# Patient Record
Sex: MALE | Race: WHITE | Employment: UNEMPLOYED | URBAN - METROPOLITAN AREA
[De-identification: names, ages, dates, MRNs, and addresses within clinical notes are randomized per-mention and may not be internally consistent; named-entity substitution may affect disease eponyms.]

---

## 2019-02-13 ENCOUNTER — APPOINTMENT (OUTPATIENT)
Dept: GENERAL RADIOLOGY | Age: 44
End: 2019-02-13
Attending: EMERGENCY MEDICINE
Payer: SELF-PAY

## 2019-02-13 ENCOUNTER — APPOINTMENT (OUTPATIENT)
Dept: GENERAL RADIOLOGY | Age: 44
End: 2019-02-13
Attending: PHYSICIAN ASSISTANT
Payer: SELF-PAY

## 2019-02-13 ENCOUNTER — HOSPITAL ENCOUNTER (EMERGENCY)
Age: 44
Discharge: HOME | End: 2019-02-13
Attending: EMERGENCY MEDICINE | Admitting: EMERGENCY MEDICINE
Payer: SELF-PAY

## 2019-02-13 VITALS
HEIGHT: 68 IN | DIASTOLIC BLOOD PRESSURE: 84 MMHG | SYSTOLIC BLOOD PRESSURE: 142 MMHG | WEIGHT: 188.27 LBS | HEART RATE: 92 BPM | BODY MASS INDEX: 28.53 KG/M2 | OXYGEN SATURATION: 100 % | TEMPERATURE: 99.3 F | RESPIRATION RATE: 16 BRPM

## 2019-02-13 DIAGNOSIS — J10.1 INFLUENZA A: Primary | ICD-10-CM

## 2019-02-13 LAB
ANION GAP SERPL CALC-SCNC: 7 MMOL/L (ref 5–15)
BASOPHILS # BLD: 0 K/UL (ref 0–0.1)
BASOPHILS NFR BLD: 0 % (ref 0–1)
BUN SERPL-MCNC: 30 MG/DL (ref 6–20)
BUN/CREAT SERPL: 22 (ref 12–20)
CALCIUM SERPL-MCNC: 9.3 MG/DL (ref 8.5–10.1)
CHLORIDE SERPL-SCNC: 97 MMOL/L (ref 97–108)
CO2 SERPL-SCNC: 30 MMOL/L (ref 21–32)
CREAT SERPL-MCNC: 1.38 MG/DL (ref 0.7–1.3)
DIFFERENTIAL METHOD BLD: ABNORMAL
EOSINOPHIL # BLD: 0 K/UL (ref 0–0.4)
EOSINOPHIL NFR BLD: 0 % (ref 0–7)
ERYTHROCYTE [DISTWIDTH] IN BLOOD BY AUTOMATED COUNT: 11.9 % (ref 11.5–14.5)
FLUAV AG NPH QL IA: POSITIVE
FLUBV AG NOSE QL IA: NEGATIVE
GLUCOSE SERPL-MCNC: 101 MG/DL (ref 65–100)
HCT VFR BLD AUTO: 50.9 % (ref 36.6–50.3)
HGB BLD-MCNC: 18.3 G/DL (ref 12.1–17)
IMM GRANULOCYTES # BLD AUTO: 0 K/UL (ref 0–0.04)
IMM GRANULOCYTES NFR BLD AUTO: 1 % (ref 0–0.5)
LYMPHOCYTES # BLD: 0.5 K/UL (ref 0.8–3.5)
LYMPHOCYTES NFR BLD: 8 % (ref 12–49)
MCH RBC QN AUTO: 29.7 PG (ref 26–34)
MCHC RBC AUTO-ENTMCNC: 36 G/DL (ref 30–36.5)
MCV RBC AUTO: 82.5 FL (ref 80–99)
MONOCYTES # BLD: 0.7 K/UL (ref 0–1)
MONOCYTES NFR BLD: 13 % (ref 5–13)
NEUTS SEG # BLD: 4.4 K/UL (ref 1.8–8)
NEUTS SEG NFR BLD: 78 % (ref 32–75)
NRBC # BLD: 0 K/UL (ref 0–0.01)
NRBC BLD-RTO: 0 PER 100 WBC
PLATELET # BLD AUTO: 231 K/UL (ref 150–400)
PMV BLD AUTO: 9.9 FL (ref 8.9–12.9)
POTASSIUM SERPL-SCNC: 4.1 MMOL/L (ref 3.5–5.1)
RBC # BLD AUTO: 6.17 M/UL (ref 4.1–5.7)
SODIUM SERPL-SCNC: 134 MMOL/L (ref 136–145)
WBC # BLD AUTO: 5.6 K/UL (ref 4.1–11.1)

## 2019-02-13 PROCEDURE — 36415 COLL VENOUS BLD VENIPUNCTURE: CPT

## 2019-02-13 PROCEDURE — 74011250636 HC RX REV CODE- 250/636: Performed by: PHYSICIAN ASSISTANT

## 2019-02-13 PROCEDURE — 74011250636 HC RX REV CODE- 250/636: Performed by: NURSE PRACTITIONER

## 2019-02-13 PROCEDURE — 96374 THER/PROPH/DIAG INJ IV PUSH: CPT

## 2019-02-13 PROCEDURE — 80048 BASIC METABOLIC PNL TOTAL CA: CPT

## 2019-02-13 PROCEDURE — 99282 EMERGENCY DEPT VISIT SF MDM: CPT

## 2019-02-13 PROCEDURE — 96361 HYDRATE IV INFUSION ADD-ON: CPT

## 2019-02-13 PROCEDURE — 74011250637 HC RX REV CODE- 250/637: Performed by: EMERGENCY MEDICINE

## 2019-02-13 PROCEDURE — 96375 TX/PRO/DX INJ NEW DRUG ADDON: CPT

## 2019-02-13 PROCEDURE — 71046 X-RAY EXAM CHEST 2 VIEWS: CPT

## 2019-02-13 PROCEDURE — 74011000250 HC RX REV CODE- 250: Performed by: NURSE PRACTITIONER

## 2019-02-13 PROCEDURE — 87804 INFLUENZA ASSAY W/OPTIC: CPT

## 2019-02-13 PROCEDURE — 85025 COMPLETE CBC W/AUTO DIFF WBC: CPT

## 2019-02-13 RX ORDER — ACETAMINOPHEN 500 MG
1000 TABLET ORAL
Status: DISCONTINUED | OUTPATIENT
Start: 2019-02-13 | End: 2019-02-13

## 2019-02-13 RX ORDER — ONDANSETRON 4 MG/1
4 TABLET, ORALLY DISINTEGRATING ORAL
Qty: 20 TAB | Refills: 0 | Status: SHIPPED | OUTPATIENT
Start: 2019-02-13

## 2019-02-13 RX ORDER — BUTALBITAL, ACETAMINOPHEN AND CAFFEINE 300; 40; 50 MG/1; MG/1; MG/1
1 CAPSULE ORAL
Qty: 20 CAP | Refills: 0 | Status: SHIPPED | OUTPATIENT
Start: 2019-02-13

## 2019-02-13 RX ORDER — ACETAMINOPHEN 500 MG
1000 TABLET ORAL
Status: COMPLETED | OUTPATIENT
Start: 2019-02-13 | End: 2019-02-13

## 2019-02-13 RX ORDER — KETOROLAC TROMETHAMINE 30 MG/ML
15 INJECTION, SOLUTION INTRAMUSCULAR; INTRAVENOUS
Status: COMPLETED | OUTPATIENT
Start: 2019-02-13 | End: 2019-02-13

## 2019-02-13 RX ORDER — ONDANSETRON 2 MG/ML
8 INJECTION INTRAMUSCULAR; INTRAVENOUS
Status: DISCONTINUED | OUTPATIENT
Start: 2019-02-13 | End: 2019-02-13 | Stop reason: HOSPADM

## 2019-02-13 RX ORDER — CODEINE PHOSPHATE AND GUAIFENESIN 10; 100 MG/5ML; MG/5ML
5 SOLUTION ORAL
Qty: 300 ML | Refills: 0 | Status: SHIPPED | OUTPATIENT
Start: 2019-02-13

## 2019-02-13 RX ORDER — SODIUM CHLORIDE 0.9 % (FLUSH) 0.9 %
5-10 SYRINGE (ML) INJECTION AS NEEDED
Status: DISCONTINUED | OUTPATIENT
Start: 2019-02-13 | End: 2019-02-13 | Stop reason: HOSPADM

## 2019-02-13 RX ORDER — DIPHENHYDRAMINE HYDROCHLORIDE 50 MG/ML
25 INJECTION, SOLUTION INTRAMUSCULAR; INTRAVENOUS
Status: COMPLETED | OUTPATIENT
Start: 2019-02-13 | End: 2019-02-13

## 2019-02-13 RX ADMIN — KETOROLAC TROMETHAMINE 15 MG: 30 INJECTION, SOLUTION INTRAMUSCULAR at 01:58

## 2019-02-13 RX ADMIN — SODIUM CHLORIDE 562 ML: 900 INJECTION, SOLUTION INTRAVENOUS at 18:49

## 2019-02-13 RX ADMIN — DIPHENHYDRAMINE HYDROCHLORIDE 25 MG: 50 INJECTION, SOLUTION INTRAMUSCULAR; INTRAVENOUS at 18:10

## 2019-02-13 RX ADMIN — PROCHLORPERAZINE EDISYLATE 10 MG: 5 INJECTION INTRAMUSCULAR; INTRAVENOUS at 18:09

## 2019-02-13 RX ADMIN — ACETAMINOPHEN 1000 MG: 500 TABLET ORAL at 16:27

## 2019-02-13 RX ADMIN — SODIUM CHLORIDE 1000 ML: 900 INJECTION, SOLUTION INTRAVENOUS at 18:09

## 2019-02-13 RX ADMIN — SODIUM CHLORIDE 1000 ML: 900 INJECTION, SOLUTION INTRAVENOUS at 17:59

## 2019-02-13 NOTE — LETTER
NOTIFICATION RETURN TO WORK / SCHOOL 
 
2/13/2019 7:47 PM 
 
Mr. Sandra Bingham 
[address] [place] [address] To Whom It May Concern: 
 
Sandra Bingham is currently under the care of [facility] EMERGENCY DEPT. He will return to work/school on: February 18, 2019 If there are questions or concerns please have the patient contact our office.  
 
 
 
Sincerely, 
 
 
 
Dudley Cárdenas NP 
7:47 PM

## 2019-02-13 NOTE — ED PROVIDER NOTES
EMERGENCY DEPARTMENT HISTORY AND PHYSICAL EXAM 
 
 
Date: 2/13/2019 Patient Name: David Martins History of Presenting Illness Chief Complaint Patient presents with  Flu Like Symptoms  
  pt reports cold, flu symptoms last week, now has nausea, body aches, fever and cough History Provided By: Patient HPI: David Martins, 44 y.o. male presents ambulatory to the ED with cc of cough, nasal congestion, nasal, fever, and body aches for the last week. Son with positive flu. Pt has not taken anything for symptoms. Pt denies night sweats, chest pain, pressure, SOB, CUNHA, PND, orthopnea, abdominal pain, v/d, melena, hematuria, dysuria, constipation, dizziness, and syncope. + HA There are no other complaints, changes, or physical findings at this time. PCP: None No current facility-administered medications on file prior to encounter. No current outpatient medications on file prior to encounter. Past History Past Medical History: 
History reviewed. No pertinent past medical history. Past Surgical History: 
History reviewed. No pertinent surgical history. Family History: 
History reviewed. No pertinent family history. Social History: 
Social History Tobacco Use  Smoking status: Not on file  Smokeless tobacco: Never Used Substance Use Topics  Alcohol use: Not on file  Drug use: Not on file Allergies: 
No Known Allergies Review of Systems Review of Systems Constitutional: Positive for chills, fatigue and fever. Negative for activity change, appetite change, diaphoresis and unexpected weight change. HENT: Positive for congestion. Negative for ear pain, rhinorrhea, sinus pressure, sore throat, tinnitus, trouble swallowing and voice change. Eyes: Negative for pain, discharge, redness and visual disturbance. Respiratory: Positive for cough. Negative for apnea, choking, chest tightness, shortness of breath, wheezing and stridor. Cardiovascular: Negative for chest pain, palpitations and leg swelling. Gastrointestinal: Negative for abdominal pain, constipation, nausea and vomiting. Endocrine: Negative for cold intolerance and heat intolerance. Genitourinary: Negative for difficulty urinating, dysuria, flank pain, hematuria, testicular pain and urgency. Musculoskeletal: Positive for myalgias. Negative for arthralgias, back pain, gait problem, joint swelling, neck pain and neck stiffness. Skin: Negative for color change, pallor, rash and wound. Allergic/Immunologic: Negative for immunocompromised state. Neurological: Negative for dizziness, tremors, syncope, weakness, light-headedness, numbness and headaches. Hematological: Does not bruise/bleed easily. Psychiatric/Behavioral: Negative for agitation, confusion and suicidal ideas. Physical Exam  
Physical Exam  
Constitutional: He is oriented to person, place, and time. He appears well-developed and well-nourished. No distress. HENT:  
Head: Atraumatic. Nose: Nose normal.  
Mouth/Throat: No oropharyngeal exudate. Eyes: Conjunctivae and EOM are normal. Right eye exhibits no discharge. Left eye exhibits no discharge. No scleral icterus. Neck: Normal range of motion. Neck supple. No JVD present. No tracheal deviation present. No thyromegaly present. Cardiovascular: Normal rate and regular rhythm. Exam reveals no gallop and no friction rub. No murmur heard. Pulmonary/Chest: Breath sounds normal. No stridor. No respiratory distress. He has no wheezes. He has no rales. He exhibits no tenderness. Abdominal: Soft. Bowel sounds are normal. He exhibits no distension and no mass. There is no tenderness. There is no rebound and no guarding. Musculoskeletal: Normal range of motion. He exhibits no edema or tenderness. Lymphadenopathy:  
  He has no cervical adenopathy. Neurological: He is alert and oriented to person, place, and time.  Coordination normal.  
 Skin: Skin is warm and dry. He is not diaphoretic. Psychiatric: He has a normal mood and affect. His behavior is normal.  
Nursing note and vitals reviewed. Diagnostic Study Results Labs - Recent Results (from the past 12 hour(s)) INFLUENZA A & B AG (RAPID TEST) Collection Time: 02/13/19  4:28 PM  
Result Value Ref Range Influenza A Antigen POSITIVE (A) NEG Influenza B Antigen NEGATIVE  NEG    
CBC WITH AUTOMATED DIFF Collection Time: 02/13/19  4:30 PM  
Result Value Ref Range WBC 5.6 4.1 - 11.1 K/uL  
 RBC 6.17 (H) 4.10 - 5.70 M/uL  
 HGB 18.3 (H) 12.1 - 17.0 g/dL HCT 50.9 (H) 36.6 - 50.3 % MCV 82.5 80.0 - 99.0 FL  
 MCH 29.7 26.0 - 34.0 PG  
 MCHC 36.0 30.0 - 36.5 g/dL  
 RDW 11.9 11.5 - 14.5 % PLATELET 168 598 - 307 K/uL MPV 9.9 8.9 - 12.9 FL  
 NRBC 0.0 0  WBC ABSOLUTE NRBC 0.00 0.00 - 0.01 K/uL NEUTROPHILS 78 (H) 32 - 75 % LYMPHOCYTES 8 (L) 12 - 49 % MONOCYTES 13 5 - 13 % EOSINOPHILS 0 0 - 7 % BASOPHILS 0 0 - 1 % IMMATURE GRANULOCYTES 1 (H) 0.0 - 0.5 % ABS. NEUTROPHILS 4.4 1.8 - 8.0 K/UL  
 ABS. LYMPHOCYTES 0.5 (L) 0.8 - 3.5 K/UL  
 ABS. MONOCYTES 0.7 0.0 - 1.0 K/UL  
 ABS. EOSINOPHILS 0.0 0.0 - 0.4 K/UL  
 ABS. BASOPHILS 0.0 0.0 - 0.1 K/UL  
 ABS. IMM. GRANS. 0.0 0.00 - 0.04 K/UL  
 DF AUTOMATED METABOLIC PANEL, BASIC Collection Time: 02/13/19  4:30 PM  
Result Value Ref Range Sodium 134 (L) 136 - 145 mmol/L Potassium 4.1 3.5 - 5.1 mmol/L Chloride 97 97 - 108 mmol/L  
 CO2 30 21 - 32 mmol/L Anion gap 7 5 - 15 mmol/L Glucose 101 (H) 65 - 100 mg/dL BUN 30 (H) 6 - 20 MG/DL Creatinine 1.38 (H) 0.70 - 1.30 MG/DL  
 BUN/Creatinine ratio 22 (H) 12 - 20 GFR est AA >60 >60 ml/min/1.73m2 GFR est non-AA 57 (L) >60 ml/min/1.73m2 Calcium 9.3 8.5 - 10.1 MG/DL Radiologic Studies -  
XR CHEST PA LAT Final Result Impression: No acute cardiopulmonary process. CT Results  (Last 48 hours) None CXR Results  (Last 48 hours) 02/13/19 1718  XR CHEST PA LAT Final result Impression:  Impression: No acute cardiopulmonary process. Narrative:  Chest PA and lateral  
   
History: Cough Comparison: 7/6/2009 Findings: The lungs are well expanded. No focal consolidation, pleural  
effusion, or pneumothorax. The cardiomediastinal silhouette is unremarkable. The visualized osseous structures are unremarkable. Medical Decision Making I am the first provider for this patient. I reviewed the vital signs, available nursing notes, past medical history, past surgical history, family history and social history. Vital Signs-Reviewed the patient's vital signs. Patient Vitals for the past 12 hrs: 
 Temp Pulse Resp BP SpO2  
02/13/19 1915     100 % 02/13/19 1912     100 % 02/13/19 1907 99.3 °F (37.4 °C) 92 16 142/84 99 % 02/13/19 1814 100.3 °F (37.9 °C) 98 18 140/82 99 % 02/13/19 1623 (!) 101.9 °F (38.8 °C) (!) 113 16 (!) 141/95 99 % Pulse Oximetry Analysis - 99% on RA Cardiac Monitor:  
Rate: 98 bpm 
 
Records Reviewed: Nursing Notes, Old Medical Records, Previous electrocardiograms, Previous Radiology Studies and Previous Laboratory Studies Provider Notes (Medical Decision Making):  
Flu like symptoms Routine laboratory data IVF Tylenol Compazine and benadryl ED Course:  
Initial assessment performed. The patients presenting problems have been discussed, and they are in agreement with the care plan formulated and outlined with them. I have encouraged them to ask questions as they arise throughout their visit. Stable, ambulatory pt in [facility] Critical Care Time:  
0 Disposition: 
Discharge to home with PCP follow up PLAN: 
1. Discharge Medication List as of 2/13/2019  7:47 PM  
  
START taking these medications Details butalbital-acetaminophen-caff (FIORICET) -40 mg per capsule Take 1 Cap by mouth every four (4) hours as needed for Pain or Headache., Print, Disp-20 Cap, R-0  
  
guaiFENesin-codeine (ROBITUSSIN AC) 100-10 mg/5 mL solution Take 5 mL by mouth three (3) times daily as needed for Cough. Max Daily Amount: 15 mL. , Print, Disp-300 mL, R-0  
  
ondansetron (ZOFRAN ODT) 4 mg disintegrating tablet Take 1 Tab by mouth every eight (8) hours as needed for Nausea. , Print, Disp-20 Tab, R-0  
  
  
 
2. Follow-up Information Follow up With Specialties Details Why Contact Info [facility] EMERGENCY DEPT Emergency Medicine  As needed, If symptoms worsen [address] [place] [address] [address] [facility] 
[phone] Return to ED if worse Diagnosis Clinical Impression:  
1. Influenza A Attestations: 
 
Eugenia Bethea NP 
8:35 PM

## 2019-02-14 NOTE — ED NOTES
Bedside shift change report given to Sharifa Collins (oncoming nurse) by Bernadine/Lavell RN (offgoing nurse). Report included the following information SBAR, Kardex, ED Summary and MAR.

## 2019-02-14 NOTE — ED NOTES
Pt is resting in bed with no acute distress noted - reports relief from headache after medications - IVF continue to infuse at this time - will continue to monitor.

## 2019-02-14 NOTE — DISCHARGE INSTRUCTIONS
Patient Education        Influenza (Flu): Care Instructions  Your Care Instructions    Influenza (flu) is an infection in the lungs and breathing passages. It is caused by the influenza virus. There are different strains, or types, of the flu virus from year to year. Unlike the common cold, the flu comes on suddenly and the symptoms, such as a cough, congestion, fever, chills, fatigue, aches, and pains, are more severe. These symptoms may last up to 10 days. Although the flu can make you feel very sick, it usually doesn't cause serious health problems. Home treatment is usually all you need for flu symptoms. But your doctor may prescribe antiviral medicine to prevent other health problems, such as pneumonia, from developing. Older people and those who have a long-term health condition, such as lung disease, are most at risk for having pneumonia or other health problems. Follow-up care is a key part of your treatment and safety. Be sure to make and go to all appointments, and call your doctor if you are having problems. It's also a good idea to know your test results and keep a list of the medicines you take. How can you care for yourself at home? · Get plenty of rest.  · Drink plenty of fluids, enough so that your urine is light yellow or clear like water. If you have kidney, heart, or liver disease and have to limit fluids, talk with your doctor before you increase the amount of fluids you drink. · Take an over-the-counter pain medicine if needed, such as acetaminophen (Tylenol), ibuprofen (Advil, Motrin), or naproxen (Aleve), to relieve fever, headache, and muscle aches. Read and follow all instructions on the label. No one younger than 20 should take aspirin. It has been linked to Reye syndrome, a serious illness. · Do not smoke. Smoking can make the flu worse. If you need help quitting, talk to your doctor about stop-smoking programs and medicines.  These can increase your chances of quitting for good.  · Breathe moist air from a hot shower or from a sink filled with hot water to help clear a stuffy nose. · Before you use cough and cold medicines, check the label. These medicines may not be safe for young children or for people with certain health problems. · If the skin around your nose and lips becomes sore, put some petroleum jelly on the area. · To ease coughing:  ? Drink fluids to soothe a scratchy throat. ? Suck on cough drops or plain hard candy. ? Take an over-the-counter cough medicine that contains dextromethorphan to help you get some sleep. Read and follow all instructions on the label. ? Raise your head at night with an extra pillow. This may help you rest if coughing keeps you awake. · Take any prescribed medicine exactly as directed. Call your doctor if you think you are having a problem with your medicine. To avoid spreading the flu  · Wash your hands regularly, and keep your hands away from your face. · Stay home from school, work, and other public places until you are feeling better and your fever has been gone for at least 24 hours. The fever needs to have gone away on its own without the help of medicine. · Ask people living with you to talk to their doctors about preventing the flu. They may get antiviral medicine to keep from getting the flu from you. · To prevent the flu in the future, get a flu vaccine every fall. Encourage people living with you to get the vaccine. · Cover your mouth when you cough or sneeze. When should you call for help? Call 911 anytime you think you may need emergency care.  For example, call if:    · You have severe trouble breathing.    Call your doctor now or seek immediate medical care if:    · You have new or worse trouble breathing.     · You seem to be getting much sicker.     · You feel very sleepy or confused.     · You have a new or higher fever.     · You get a new rash.    Watch closely for changes in your health, and be sure to contact your doctor if:    · You begin to get better and then get worse.     · You are not getting better after 1 week. Where can you learn more? Go to http://tori-michael.info/. Enter Y883 in the search box to learn more about \"Influenza (Flu): Care Instructions. \"  Current as of: September 5, 2018  Content Version: 11.9  © 5952-8875 [employer]. Care instructions adapted under license by [employer] (which disclaims liability or warranty for this information). If you have questions about a medical condition or this instruction, always ask your healthcare professional. [place] [address] any warranty or liability for your use of this information. We hope that we have addressed all of your medical concerns. The examination and treatment you received in the Emergency Department were for an emergent problem and were not intended as complete care. It is important that you follow up with your healthcare provider(s) for ongoing care. If your symptoms worsen or do not improve as expected, and you are unable to reach your usual health care provider(s), you should return to the Emergency Department. [facility] participate in nationally recognized quality of care measures. If your blood pressure is greater than 120/80, as reported below, we urge that you seek medical care to address the potential of high blood pressure, commonly known as hypertension. Hypertension can be hereditary or can be caused by certain medical conditions, pain, stress, or \"white coat syndrome. \"       Please make an appointment with your health care provider(s) for follow up of your Emergency Department visit.        VITALS:   Patient Vitals for the past 8 hrs:   Temp Pulse Resp BP SpO2   02/13/19 1907 99.3 °F (37.4 °C) 92 16 142/84 99 %   02/13/19 1814 100.3 °F (37.9 °C) 98 18 140/82 99 %   02/13/19 1623 (!) 101.9 °F (38.8 °C) (!) 113 16 (!) 141/95 99 % Thank you for allowing us to provide you with medical care today. We realize that you have many choices for your emergency care needs. Please choose us in the future for any continued health care needs. Demian Cummings NP            Recent Results (from the past 24 hour(s))   INFLUENZA A & B AG (RAPID TEST)    Collection Time: 02/13/19  4:28 PM   Result Value Ref Range    Influenza A Antigen POSITIVE (A) NEG      Influenza B Antigen NEGATIVE  NEG     CBC WITH AUTOMATED DIFF    Collection Time: 02/13/19  4:30 PM   Result Value Ref Range    WBC 5.6 4.1 - 11.1 K/uL    RBC 6.17 (H) 4.10 - 5.70 M/uL    HGB 18.3 (H) 12.1 - 17.0 g/dL    HCT 50.9 (H) 36.6 - 50.3 %    MCV 82.5 80.0 - 99.0 FL    MCH 29.7 26.0 - 34.0 PG    MCHC 36.0 30.0 - 36.5 g/dL    RDW 11.9 11.5 - 14.5 %    PLATELET 484 403 - 640 K/uL    MPV 9.9 8.9 - 12.9 FL    NRBC 0.0 0  WBC    ABSOLUTE NRBC 0.00 0.00 - 0.01 K/uL    NEUTROPHILS 78 (H) 32 - 75 %    LYMPHOCYTES 8 (L) 12 - 49 %    MONOCYTES 13 5 - 13 %    EOSINOPHILS 0 0 - 7 %    BASOPHILS 0 0 - 1 %    IMMATURE GRANULOCYTES 1 (H) 0.0 - 0.5 %    ABS. NEUTROPHILS 4.4 1.8 - 8.0 K/UL    ABS. LYMPHOCYTES 0.5 (L) 0.8 - 3.5 K/UL    ABS. MONOCYTES 0.7 0.0 - 1.0 K/UL    ABS. EOSINOPHILS 0.0 0.0 - 0.4 K/UL    ABS. BASOPHILS 0.0 0.0 - 0.1 K/UL    ABS. IMM.  GRANS. 0.0 0.00 - 0.04 K/UL    DF AUTOMATED     METABOLIC PANEL, BASIC    Collection Time: 02/13/19  4:30 PM   Result Value Ref Range    Sodium 134 (L) 136 - 145 mmol/L    Potassium 4.1 3.5 - 5.1 mmol/L    Chloride 97 97 - 108 mmol/L    CO2 30 21 - 32 mmol/L    Anion gap 7 5 - 15 mmol/L    Glucose 101 (H) 65 - 100 mg/dL    BUN 30 (H) 6 - 20 MG/DL    Creatinine 1.38 (H) 0.70 - 1.30 MG/DL    BUN/Creatinine ratio 22 (H) 12 - 20      GFR est AA >60 >60 ml/min/1.73m2    GFR est non-AA 57 (L) >60 ml/min/1.73m2    Calcium 9.3 8.5 - 10.1 MG/DL       Xr Chest Pa Lat    Result Date: 2/13/2019  Chest PA and lateral History: Cough Comparison: 7/6/2009 Findings: The lungs are well expanded. No focal consolidation, pleural effusion, or pneumothorax. The cardiomediastinal silhouette is unremarkable. The visualized osseous structures are unremarkable. Impression: No acute cardiopulmonary process.

## 2019-02-14 NOTE — ED NOTES
SHARAN Silver have reviewed discharge instructions with the patient. The patient verbalized understanding.